# Patient Record
Sex: MALE | Race: WHITE | NOT HISPANIC OR LATINO | Employment: OTHER | URBAN - METROPOLITAN AREA
[De-identification: names, ages, dates, MRNs, and addresses within clinical notes are randomized per-mention and may not be internally consistent; named-entity substitution may affect disease eponyms.]

---

## 2022-05-03 ENCOUNTER — TELEPHONE (OUTPATIENT)
Dept: NEPHROLOGY | Facility: CLINIC | Age: 77
End: 2022-05-03
Payer: MEDICARE

## 2022-05-03 NOTE — TELEPHONE ENCOUNTER
----- Message from Mónica Manjula sent at 5/3/2022 12:44 PM CDT -----  Contact: pt  Type: Needs Medical Advice         Who Called: pt wife  Best Call Back Number:[phone]  Additional Information: Requesting a call back regarding  pt said she has a follow up visit that shows a 05/16/2022 at 1pm with office but our epic doesn't show next appt for pt. Pt would like office to call to confirm appt and blood work that is needed.        Please Advise- Thank you

## 2022-05-13 RX ORDER — LANCETS
EACH MISCELLANEOUS 3 TIMES DAILY
COMMUNITY
Start: 2022-05-02

## 2022-05-13 RX ORDER — DIVALPROEX SODIUM 500 MG/1
1500 TABLET, DELAYED RELEASE ORAL NIGHTLY
COMMUNITY
Start: 2022-03-21

## 2022-05-13 RX ORDER — TIZANIDINE 4 MG/1
4 TABLET ORAL EVERY 8 HOURS PRN
COMMUNITY
Start: 2022-03-17

## 2022-05-13 RX ORDER — OXYCODONE AND ACETAMINOPHEN 10; 325 MG/1; MG/1
1 TABLET ORAL EVERY 8 HOURS PRN
COMMUNITY
Start: 2022-03-26

## 2022-05-13 RX ORDER — ALBUTEROL SULFATE 90 UG/1
2 AEROSOL, METERED RESPIRATORY (INHALATION) EVERY 6 HOURS PRN
COMMUNITY

## 2022-05-13 RX ORDER — METHOCARBAMOL 500 MG/1
500 TABLET, FILM COATED ORAL 4 TIMES DAILY PRN
COMMUNITY
Start: 2022-04-17

## 2022-05-13 RX ORDER — ACETAMINOPHEN 325 MG/1
650 TABLET ORAL EVERY 4 HOURS PRN
COMMUNITY
Start: 2022-01-19

## 2022-05-13 RX ORDER — INSULIN ASPART 100 [IU]/ML
36 INJECTION, SUSPENSION SUBCUTANEOUS 2 TIMES DAILY WITH MEALS
COMMUNITY
Start: 2022-03-25

## 2022-05-13 RX ORDER — LAMOTRIGINE 100 MG/1
100 TABLET ORAL DAILY
COMMUNITY
Start: 2022-02-24

## 2022-05-13 RX ORDER — ATORVASTATIN CALCIUM 40 MG/1
40 TABLET, FILM COATED ORAL NIGHTLY
COMMUNITY
Start: 2022-04-17

## 2022-05-13 RX ORDER — LOSARTAN POTASSIUM 100 MG/1
100 TABLET ORAL DAILY
COMMUNITY
End: 2023-06-07 | Stop reason: ALTCHOICE

## 2022-05-13 RX ORDER — CLOPIDOGREL BISULFATE 75 MG/1
75 TABLET ORAL DAILY
COMMUNITY
Start: 2022-02-07 | End: 2023-06-07 | Stop reason: ALTCHOICE

## 2022-05-13 RX ORDER — CARVEDILOL 3.12 MG/1
3.12 TABLET ORAL 2 TIMES DAILY
COMMUNITY
Start: 2021-12-26

## 2022-05-13 RX ORDER — AMLODIPINE BESYLATE 10 MG/1
10 TABLET ORAL DAILY
COMMUNITY
Start: 2022-01-05

## 2022-05-13 RX ORDER — ASPIRIN 81 MG/1
81 TABLET ORAL DAILY
COMMUNITY

## 2022-05-13 RX ORDER — GABAPENTIN 100 MG/1
300 CAPSULE ORAL NIGHTLY
COMMUNITY
Start: 2022-01-07

## 2022-05-13 RX ORDER — FLUTICASONE PROPIONATE AND SALMETEROL XINAFOATE 45; 21 UG/1; UG/1
2 AEROSOL, METERED RESPIRATORY (INHALATION) 2 TIMES DAILY
COMMUNITY
Start: 2022-01-04

## 2022-05-13 RX ORDER — PEN NEEDLE, DIABETIC 31 GX5/16"
NEEDLE, DISPOSABLE MISCELLANEOUS 2 TIMES DAILY
COMMUNITY
Start: 2021-12-17

## 2022-05-13 RX ORDER — ISOSORBIDE MONONITRATE 30 MG/1
30 TABLET, EXTENDED RELEASE ORAL DAILY
COMMUNITY
Start: 2022-02-24

## 2022-05-13 RX ORDER — TAMSULOSIN HYDROCHLORIDE 0.4 MG/1
0.4 CAPSULE ORAL NIGHTLY
COMMUNITY
Start: 2022-04-06 | End: 2023-06-07 | Stop reason: ALTCHOICE

## 2022-05-13 RX ORDER — PANTOPRAZOLE SODIUM 40 MG/1
40 TABLET, DELAYED RELEASE ORAL DAILY PRN
COMMUNITY
Start: 2022-01-30

## 2022-05-17 ENCOUNTER — OFFICE VISIT (OUTPATIENT)
Dept: NEPHROLOGY | Facility: CLINIC | Age: 77
End: 2022-05-17
Payer: MEDICARE

## 2022-05-17 VITALS
WEIGHT: 215.38 LBS | DIASTOLIC BLOOD PRESSURE: 78 MMHG | BODY MASS INDEX: 28.54 KG/M2 | SYSTOLIC BLOOD PRESSURE: 159 MMHG | OXYGEN SATURATION: 97 % | HEART RATE: 60 BPM | HEIGHT: 73 IN | TEMPERATURE: 98 F

## 2022-05-17 DIAGNOSIS — E11.21 DIABETIC NEPHROPATHY ASSOCIATED WITH TYPE 2 DIABETES MELLITUS: Primary | ICD-10-CM

## 2022-05-17 DIAGNOSIS — N18.32 STAGE 3B CHRONIC KIDNEY DISEASE: ICD-10-CM

## 2022-05-17 DIAGNOSIS — Z90.5 ACQUIRED ABSENCE OF LEFT KIDNEY: ICD-10-CM

## 2022-05-17 DIAGNOSIS — R80.1 PERSISTENT PROTEINURIA: ICD-10-CM

## 2022-05-17 DIAGNOSIS — I13.10 HYPERTENSIVE HEART AND KIDNEY DISEASE WITHOUT HEART FAILURE AND WITH STAGE 3B CHRONIC KIDNEY DISEASE: ICD-10-CM

## 2022-05-17 DIAGNOSIS — N18.32 HYPERTENSIVE HEART AND KIDNEY DISEASE WITHOUT HEART FAILURE AND WITH STAGE 3B CHRONIC KIDNEY DISEASE: ICD-10-CM

## 2022-05-17 PROCEDURE — 99214 PR OFFICE/OUTPT VISIT, EST, LEVL IV, 30-39 MIN: ICD-10-PCS | Mod: S$GLB,,, | Performed by: STUDENT IN AN ORGANIZED HEALTH CARE EDUCATION/TRAINING PROGRAM

## 2022-05-17 PROCEDURE — 99214 OFFICE O/P EST MOD 30 MIN: CPT | Mod: S$GLB,,, | Performed by: STUDENT IN AN ORGANIZED HEALTH CARE EDUCATION/TRAINING PROGRAM

## 2022-05-17 NOTE — PROGRESS NOTES
"Outpatient Nephrology Progress Note    Pt Name:  Omar Young  Pt :  1945  Pt MRN:  [id number]    Date: 2022    Reason for visit:   Chronic kidney disease    Chief Complaint:   The patient denies any complaints today.    HPI:  The patient is being seen today for follow up of CKD and the status of his kidney function.    Since the last visit, the patient reports he had back surgery at OSH. He then suffered a fall and had to have another surgery. He went to rehab following that but is now back at home doing home health.   Home BPs when checked are "alright", often high in the morning, but then 130 in the afternoon.   He reports he was having some difficulty urinating when he first went back home, but this has resolved and he has no pain. He did have a holland catheter in place for a while prior.   The patient is avoiding NSAIDs.  His wife is present with him.  They ask many questions re: things they can do to improve his kidney function and overall health. They are very motivated and receptive.     The patient denies fatigue, weakness, poor appetite, metallic taste, nausea, cramping, chest pain, palpitations, SOB, orthopnea, PND, edema, trouble concentrating, decreased urination.          History:   Past Medical History:   Diagnosis Date    Asthma     Carotid artery occlusion     CKD (chronic kidney disease)     COPD (chronic obstructive pulmonary disease)     CPAP (continuous positive airway pressure) dependence     GERD (gastroesophageal reflux disease)     Hiatal hernia     History of blood clots     HTN (hypertension)     Hyperlipidemia     Obesity     INDIRA (obstructive sleep apnea)     Proteinuria     Renal cell carcinoma     Seizures     TIA (transient ischemic attack)     Type 2 diabetes mellitus      Past Surgical History:   Procedure Laterality Date    BACK SURGERY      CARDIAC CATHETERIZATION      CAROTID ENDARTERECTOMY      CORONARY ANGIOPLASTY WITH STENT PLACEMENT      " "NEPHRECTOMY       Family History   Problem Relation Age of Onset    Hypertension Mother     Heart disease Mother     Hypertension Father     Heart disease Father      Social History     Substance and Sexual Activity   Alcohol Use Never     Social History     Substance and Sexual Activity   Drug Use Not Currently     Social History     Substance and Sexual Activity   Sexual Activity Not Currently     reports previously being sexually active.  Social History     Tobacco Use   Smoking Status Former Smoker    Packs/day: 2.00    Years: 25.00    Pack years: 50.00    Types: Cigarettes    Quit date: 1990    Years since quittin.3   Smokeless Tobacco Former User    Quit date: 1990       Allergies:  Review of patient's allergies indicates:   Allergen Reactions    Codeine Other (See Comments)     hallucinations  hallucinations      Fexofenadine Other (See Comments)     Head aches, throat swelling  Throat swells & headache      Loratadine          Current Outpatient Medications:     acetaminophen (TYLENOL) 325 MG tablet, Take 650 mg by mouth every 4 (four) hours as needed., Disp: , Rfl:     albuterol (PROVENTIL/VENTOLIN HFA) 90 mcg/actuation inhaler, Inhale 2 puffs into the lungs every 6 (six) hours as needed., Disp: , Rfl:     amLODIPine (NORVASC) 10 MG tablet, Take 10 mg by mouth once daily., Disp: , Rfl:     aspirin (ECOTRIN) 81 MG EC tablet, Take 81 mg by mouth Daily., Disp: , Rfl:     atorvastatin (LIPITOR) 40 MG tablet, Take 40 mg by mouth nightly., Disp: , Rfl:     BD ULTRA-FINE MINI PEN NEEDLE 31 gauge x 3/16" Ndle, 2 (two) times a day., Disp: , Rfl:     blood sugar diagnostic Strp, TEST SUGARS TWICE DAILY, Disp: , Rfl:     clopidogreL (PLAVIX) 75 mg tablet, Take 75 mg by mouth once daily., Disp: , Rfl:     divalproex (DEPAKOTE) 500 MG TbEC, Take 1,500 mg by mouth every evening., Disp: , Rfl:     fluticasone propion-salmeterol 45-21 mcg/dose (ADVAIR HFA) 45-21 mcg/actuation HFAA " inhaler, Inhale 2 puffs into the lungs 2 (two) times a day., Disp: , Rfl:     gabapentin (NEURONTIN) 100 MG capsule, Take 300 mg by mouth nightly., Disp: , Rfl:     insulin aspart protamine-insulin aspart (NOVOLOG 70/30) 100 unit/mL (70-30) InPn pen, Inject 28 Units into the skin 2 (two) times daily with meals., Disp: , Rfl:     isosorbide mononitrate (IMDUR) 30 MG 24 hr tablet, Take 30 mg by mouth once daily., Disp: , Rfl:     lamoTRIgine (LAMICTAL) 100 MG tablet, Take 100 mg by mouth once daily., Disp: , Rfl:     lancets Misc, 3 (three) times daily., Disp: , Rfl:     pantoprazole (PROTONIX) 40 MG tablet, Take 40 mg by mouth once daily., Disp: , Rfl:     carvediloL (COREG) 3.125 MG tablet, Take 3.125 mg by mouth 2 (two) times daily., Disp: , Rfl:     losartan (COZAAR) 100 MG tablet, Take 100 mg by mouth Daily., Disp: , Rfl:     methocarbamoL (ROBAXIN) 500 MG Tab, Take 500 mg by mouth 4 (four) times daily as needed., Disp: , Rfl:     oxyCODONE-acetaminophen (PERCOCET)  mg per tablet, Take 1 tablet by mouth every 8 (eight) hours as needed., Disp: , Rfl:     tamsulosin (FLOMAX) 0.4 mg Cap, Take 0.4 mg by mouth every evening., Disp: , Rfl:     tiZANidine (ZANAFLEX) 4 MG tablet, Take 4 mg by mouth every 8 (eight) hours as needed., Disp: , Rfl:     Review of Systems   Constitutional: Positive for fatigue. Negative for appetite change, chills and fever.   Respiratory: Negative for cough, shortness of breath and wheezing.    Cardiovascular: Negative for chest pain and palpitations.   Gastrointestinal: Negative for abdominal pain.   Genitourinary: Negative for decreased urine volume, difficulty urinating, dysuria, flank pain, frequency, hematuria and urgency.   Musculoskeletal: Positive for arthralgias, back pain and gait problem.   Skin: Negative for rash.   Neurological: Negative for dizziness and light-headedness.   + chronic issues related to chronic medical conditions      Physical Exam:   Vitals:  "  Vitals:    05/17/22 1329   BP: (!) 159/78   Pulse: 60   Temp: 98.1 °F (36.7 °C)   TempSrc: Oral   SpO2: 97%   Weight: 97.7 kg (215 lb 6.4 oz)   Height: 6' 1" (1.854 m)   PainSc: 0-No pain     Body mass index is 28.42 kg/m².    Physical Exam  Vitals reviewed.   Constitutional:       General: He is not in acute distress.     Appearance: Normal appearance. He is not ill-appearing, toxic-appearing or diaphoretic.   HENT:      Head: Normocephalic and atraumatic.   Eyes:      General: No scleral icterus.        Right eye: No discharge.         Left eye: No discharge.      Extraocular Movements: Extraocular movements intact.      Conjunctiva/sclera: Conjunctivae normal.   Pulmonary:      Effort: Pulmonary effort is normal. No respiratory distress.   Musculoskeletal:         General: No swelling or deformity.      Right lower leg: No edema.      Left lower leg: No edema.   Skin:     General: Skin is warm and dry.      Coloration: Skin is not jaundiced or pale.      Findings: No bruising, erythema, lesion or rash.   Neurological:      General: No focal deficit present.      Mental Status: He is alert and oriented to person, place, and time. Mental status is at baseline.   Psychiatric:         Mood and Affect: Mood normal.         Behavior: Behavior normal.         Thought Content: Thought content normal.         Judgment: Judgment normal.     Ambulates slowly using walker      Labs/Tests:  Lab Results   Component Value Date     05/13/2022    K 3.9 05/13/2022     05/13/2022    CO2 26 05/13/2022    BUN 15 05/13/2022    CREATININE 1.67 (H) 05/13/2022    GLUCOSE 83 05/13/2022    CALCIUM 9.8 05/13/2022    ALBUMIN 4.1 05/13/2022    EGFRNONAA 39 (L) 05/13/2022    HGB 12.3 05/13/2022    HGBA1C 6.6 (H) 03/10/2022    FERRITIN 208.4 02/21/2020    HEPBSAG NON-REACTIVE 02/21/2020    TRIG 330 (H) 09/28/2021    CHOL 153 09/28/2021    HDL 26 (L) 09/28/2021    LDLCALC 61 09/28/2021    LABVLDL 66 (H) 09/28/2021       Problem " Nancy Nelson was seen today for chronic kidney disease and follow-up.    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 This remains improved from initial visit and recent baseline 2.2-2.5, previously 1.8-2.2 since 2019. UPCR 0.5 and stable.   CKD due to loss of nephron mass s/p left nephrectomy along with contributions from diabetes, HTN, vascular disease, CV disease, HLP, obesity, aging. Proteinuria not unexpected as result of hyperfiltration due to solitary kidney which can cause secondary FSGS process especially along with obesity and diabetes/HTN.   Renal biopsy relatively contraindicated given solitary kidney and wouldn't recommend given bland UA and well <1gm/day proteinuria and improving renal fxn making acute process such as GN unlikely.   Continued NSAID avoidance.  Discussed good BP, glycemic and lipid control to help prevent accelerated CKD and cardiovascular disease progression with the patient.   Continue ARB    2. HTN in CKD- seems reasonably controlled at home.   Goal BP< 130/80 as this was discussed with the patient. Discussed life style modifications with patient to include weight reduction (5-20mm Hg reduction/10Kg weight loss), DASH diet (8-14 mm Hg reduction), sodium restriction of 2.4 grams daily ( 2-8 mm Hg reduction), exercise ( 4-9 mm Hg reduction), and moderate alcohol consumption < 2 drinks per day (2-4 mm Hg reduction) per [facility] 7 report VONDA 2003.      3. S/p left nephrectomy- due to RCC. Right kidney visualized and normal on RUQUS in 2019. Would recommend updated imaging      4. Diabetic kidney disease-  On ARB, statin. SGLT2i  contraindicated when eGFR <30 so could be consider if renal fxn remains improved. Recommend follow up with PCM for management of diabetes.     5. CKD-MBD- In patients with CKD G3a - G5 not on dialysis the optimal PTH is not known. Treatment of CKD-MBD is targeted at lowering serum phosphate toward the normal range and avoiding hypercalcemia. Ca and phos are reasonable. Will check VitD-25 stores and replete if necessary. Goal VitD-25 level > 30 to ensure adequate stores.       The patient has been provided with their current level of kidney function including eGFR and creatinine.    Recommendations:   Monitor your BP at home daily and record. Bring readings to your next appt. Call the office if your BP is persistently >130/80.      Follow Up:   Follow up in about 4 months (around 9/17/2022).

## 2022-07-18 LAB — CRC RECOMMENDATION EXT: NORMAL

## 2022-09-06 DIAGNOSIS — D63.1 ANEMIA OF CHRONIC RENAL FAILURE, UNSPECIFIED CKD STAGE: ICD-10-CM

## 2022-09-06 DIAGNOSIS — N25.81 SECONDARY HYPERPARATHYROIDISM OF RENAL ORIGIN: ICD-10-CM

## 2022-09-06 DIAGNOSIS — N18.32 STAGE 3B CHRONIC KIDNEY DISEASE: Primary | ICD-10-CM

## 2022-09-06 DIAGNOSIS — N18.9 ANEMIA OF CHRONIC RENAL FAILURE, UNSPECIFIED CKD STAGE: ICD-10-CM

## 2022-11-30 ENCOUNTER — OFFICE VISIT (OUTPATIENT)
Dept: NEPHROLOGY | Facility: CLINIC | Age: 77
End: 2022-11-30
Payer: MEDICARE

## 2022-11-30 VITALS
OXYGEN SATURATION: 97 % | BODY MASS INDEX: 30.6 KG/M2 | SYSTOLIC BLOOD PRESSURE: 170 MMHG | DIASTOLIC BLOOD PRESSURE: 82 MMHG | HEIGHT: 73 IN | HEART RATE: 58 BPM | WEIGHT: 230.88 LBS

## 2022-11-30 DIAGNOSIS — E11.21 DIABETIC NEPHROPATHY ASSOCIATED WITH TYPE 2 DIABETES MELLITUS: ICD-10-CM

## 2022-11-30 DIAGNOSIS — N18.32 STAGE 3B CHRONIC KIDNEY DISEASE: Primary | ICD-10-CM

## 2022-11-30 DIAGNOSIS — I10 PRIMARY HYPERTENSION: ICD-10-CM

## 2022-11-30 DIAGNOSIS — N25.81 SECONDARY HYPERPARATHYROIDISM OF RENAL ORIGIN: ICD-10-CM

## 2022-11-30 PROCEDURE — 99212 OFFICE O/P EST SF 10 MIN: CPT | Mod: S$GLB,,, | Performed by: INTERNAL MEDICINE

## 2022-11-30 PROCEDURE — 99212 PR OFFICE/OUTPT VISIT, EST, LEVL II, 10-19 MIN: ICD-10-PCS | Mod: S$GLB,,, | Performed by: INTERNAL MEDICINE

## 2022-11-30 RX ORDER — HYDRALAZINE HYDROCHLORIDE 100 MG/1
100 TABLET, FILM COATED ORAL 3 TIMES DAILY
COMMUNITY
Start: 2022-06-02

## 2022-11-30 NOTE — PROGRESS NOTES
Pt Name:  Omar Young  Pt :  1945  Pt MRN:  [id number]    Date: 2022  Provider: Ekaterina Porter    Reason for visit:   Seeing for ckd.      Chief Complaint:   Chief Complaint   Patient presents with    Chronic Kidney Disease     Stage 3 cr 2.0 GFR 34    Proteinuria       HPI:  [unfilled]  No complaints.  States bp is better at home.    History:   Past Medical History:   Diagnosis Date    Asthma     Carotid artery occlusion     CKD (chronic kidney disease)     COPD (chronic obstructive pulmonary disease)     CPAP (continuous positive airway pressure) dependence     GERD (gastroesophageal reflux disease)     Hiatal hernia     History of blood clots     HTN (hypertension)     Hyperlipidemia     Obesity     INDIRA (obstructive sleep apnea)     Proteinuria     Renal cell carcinoma     Seizures     TIA (transient ischemic attack)     Type 2 diabetes mellitus      Past Surgical History:   Procedure Laterality Date    BACK SURGERY      CARDIAC CATHETERIZATION      CAROTID ENDARTERECTOMY      CORONARY ANGIOPLASTY WITH STENT PLACEMENT      NEPHRECTOMY       Family History   Problem Relation Age of Onset    Hypertension Mother     Heart disease Mother     Hypertension Father     Heart disease Father      Social History     Substance and Sexual Activity   Alcohol Use Never     Social History     Substance and Sexual Activity   Drug Use Not Currently     Social History     Substance and Sexual Activity   Sexual Activity Not Currently     reports that he is not currently sexually active.  Social History     Tobacco Use   Smoking Status Former    Packs/day: 2.00    Years: 25.00    Pack years: 50.00    Types: Cigarettes    Quit date: 1990    Years since quittin.9   Smokeless Tobacco Former    Quit date: 1990       Allergies:  Review of patient's allergies indicates:   Allergen Reactions    Fexofenadine Swelling     Head aches, throat swelling        Loratadine Swelling    Codeine Hallucinations  "      [unfilled]    ROS:   Constitutional:  Denies fever or chills   Eyes:  Denies change in visual acuity   HENT:  Denies nasal congestion or sore throat   Respiratory:  As in the history of the present illness.   Cardiovascular:  As in the history of the present illness.   GI:  As in the history of the present illness.    Musculoskeletal:  Denies back pain or joint pain   Integument:  Denies rash   Neurologic:  Denies headache, focal weakness or sensory changes   Endocrine:  Denies polyuria or polydipsia   Lymphatic:  Denies swollen glands   Psychiatric:  Denies depression or anxiety    Physical Exam:   Vitals:   Vitals:    11/30/22 0931   BP: (!) 170/82   Pulse: (!) 58   SpO2: 97%   Weight: 104.7 kg (230 lb 14.4 oz)   Height: 6' 1" (1.854 m)   PainSc: 0-No pain     Body mass index is 30.46 kg/m².    Constitutional:  Well developed, well nourished, and in no acute distress   Eyes:  PERRLA, conjunctiva normal   HENT:  Atraumatic, external ears normal, nose normal.  Neck: There is no jugular venous distension or thyromegaly.   Respiratory:  No respiratory distress, normal breath sounds, no rales, no wheezing   Cardiovascular:  Normal rate, and a regular rhythm, no murmurs, no gallops, no rub, and no edema.  GI:  Normal bowel sounds.  Musculoskeletal:  No deformities.   Neurologic:  Alert & oriented x 3, CN 2-12 normal, normal motor function, and no asterixis.   Psychiatric:  Speech and behavior appropriate.    Labs/Tests:  Lab Results   Component Value Date     11/15/2022    K 4.6 11/15/2022     11/15/2022    CO2 27 11/15/2022    BUN 23 11/15/2022    CREATININE 1.99 (H) 11/15/2022    CREATININE 1.80 (H) 09/06/2022    CREATININE 1.81 (H) 07/11/2022    GLUCOSE 3+ (A) 11/23/2022    CALCIUM 9.5 11/15/2022    PHOSPHORUS 3.2 09/06/2022    ALBUMIN 4.2 11/15/2022    EGFRNONAA 32 (L) 11/15/2022    EGFRNONAA 36 (L) 09/06/2022    EGFRNONAA 36 (L) 07/11/2022    ESTGFRAFRICA 37 (L) 11/15/2022    ESTGFRAFRICA 41 (L) " 09/06/2022    ESTGFRAFRICA 41 (L) 07/11/2022    PTH 79 (H) 11/23/2022    HGB 13.4 11/23/2022    HGB 13.5 11/17/2022    HGB 12.4 09/06/2022    HGBA1C 7.8 (H) 11/17/2022    FERRITIN 35.9 11/15/2022    HEPBSAG NON-REACTIVE 02/21/2020    TRIG 366 (H) 11/15/2022    CHOL 127 11/15/2022    HDL 24 (L) 11/15/2022    LDLCALC 30 11/15/2022    LABVLDL 73 (H) 11/15/2022    [id number] 69.9 05/13/2022       Assessment & Plan:    Visit Diagnosis:   [unfilled]  [unfilled]    Problem List: There is no problem list on file for this patient.    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    DM        Follow Up:   Follow up in about 6 months (around 5/30/2023).

## 2023-01-10 ENCOUNTER — PATIENT OUTREACH (OUTPATIENT)
Dept: ADMINISTRATIVE | Facility: HOSPITAL | Age: 78
End: 2023-01-10
Payer: MEDICARE

## 2023-01-10 NOTE — PROGRESS NOTES
Population Health Outreach.Records Received, hyper-linked into chart at this time. The following record(s)  below were uploaded for Health Maintenance .    7/18/2022-colonoscopy

## 2023-06-07 ENCOUNTER — OFFICE VISIT (OUTPATIENT)
Dept: NEPHROLOGY | Facility: CLINIC | Age: 78
End: 2023-06-07
Payer: MEDICARE

## 2023-06-07 VITALS
SYSTOLIC BLOOD PRESSURE: 169 MMHG | WEIGHT: 246 LBS | BODY MASS INDEX: 32.6 KG/M2 | HEART RATE: 52 BPM | DIASTOLIC BLOOD PRESSURE: 65 MMHG | OXYGEN SATURATION: 97 % | HEIGHT: 73 IN

## 2023-06-07 DIAGNOSIS — N18.4 STAGE 4 CHRONIC KIDNEY DISEASE: Primary | ICD-10-CM

## 2023-06-07 DIAGNOSIS — I10 PRIMARY HYPERTENSION: ICD-10-CM

## 2023-06-07 DIAGNOSIS — E11.21 DIABETIC NEPHROPATHY ASSOCIATED WITH TYPE 2 DIABETES MELLITUS: ICD-10-CM

## 2023-06-07 PROCEDURE — 99214 PR OFFICE/OUTPT VISIT, EST, LEVL IV, 30-39 MIN: ICD-10-PCS | Mod: S$GLB,,, | Performed by: INTERNAL MEDICINE

## 2023-06-07 PROCEDURE — 99214 OFFICE O/P EST MOD 30 MIN: CPT | Mod: S$GLB,,, | Performed by: INTERNAL MEDICINE

## 2023-06-07 RX ORDER — CLONIDINE HYDROCHLORIDE 0.2 MG/1
0.2 TABLET ORAL 2 TIMES DAILY
COMMUNITY
Start: 2023-05-26

## 2023-06-07 RX ORDER — TICAGRELOR 90 MG/1
90 TABLET ORAL 2 TIMES DAILY
COMMUNITY
Start: 2023-05-21

## 2023-06-07 NOTE — PROGRESS NOTES
Pt Name:  Omar Young  Pt :  1945  Pt MRN:  [id number]    Date: 2023  Provider: Ekaterina Porter    Reason for visit: seeing for ckd.      Chief Complaint:   Chief Complaint   Patient presents with    Chronic Kidney Disease     Serum creatinine 2.38, GFR 28, CKD stage 4       HPI:  HPI   No complaints. No gi, gu, pulm or card complaints.    History:   Past Medical History:   Diagnosis Date    Asthma     Carotid artery occlusion     CKD (chronic kidney disease)     COPD (chronic obstructive pulmonary disease)     CPAP (continuous positive airway pressure) dependence     GERD (gastroesophageal reflux disease)     Hiatal hernia     History of blood clots     HTN (hypertension)     Hyperlipidemia     Obesity     INDIRA (obstructive sleep apnea)     Personal history of colonic polyps     Proteinuria     Renal cell carcinoma     Seizures     TIA (transient ischemic attack)     Type 2 diabetes mellitus      Past Surgical History:   Procedure Laterality Date    BACK SURGERY      CARDIAC CATHETERIZATION      CAROTID ENDARTERECTOMY      COLONOSCOPY  2022    Shakira Rios MD    CORONARY ANGIOPLASTY WITH STENT PLACEMENT  2023    NEPHRECTOMY       Family History   Problem Relation Age of Onset    Hypertension Mother     Heart disease Mother     Hypertension Father     Heart disease Father      Social History     Substance and Sexual Activity   Alcohol Use Never     Social History     Substance and Sexual Activity   Drug Use Not Currently     Social History     Substance and Sexual Activity   Sexual Activity Not Currently     reports that he is not currently sexually active.  Social History     Tobacco Use   Smoking Status Former    Packs/day: 2.00    Years: 25.00    Pack years: 50.00    Types: Cigarettes    Quit date: 1990    Years since quittin.4   Smokeless Tobacco Former    Quit date: 1990       Allergies:  Review of patient's allergies indicates:   Allergen Reactions    Fexofenadine  "Swelling     Head aches, throat swelling        Loratadine Swelling    Codeine Hallucinations       Current Outpatient Medications   Medication Sig Dispense Refill    acetaminophen (TYLENOL) 325 MG tablet Take 650 mg by mouth every 4 (four) hours as needed.      albuterol (PROVENTIL/VENTOLIN HFA) 90 mcg/actuation inhaler Inhale 2 puffs into the lungs every 6 (six) hours as needed.      amLODIPine (NORVASC) 10 MG tablet Take 10 mg by mouth once daily.      aspirin (ECOTRIN) 81 MG EC tablet Take 81 mg by mouth Daily.      atorvastatin (LIPITOR) 40 MG tablet Take 40 mg by mouth nightly.      BD ULTRA-FINE MINI PEN NEEDLE 31 gauge x 3/16" Ndle 2 (two) times a day.      blood sugar diagnostic Strp TEST SUGARS TWICE DAILY      carvediloL (COREG) 3.125 MG tablet Take 3.125 mg by mouth 2 (two) times daily.      divalproex (DEPAKOTE) 500 MG TbEC Take 1,500 mg by mouth every evening.      fluticasone propion-salmeterol 45-21 mcg/dose (ADVAIR HFA) 45-21 mcg/actuation HFAA inhaler Inhale 2 puffs into the lungs 2 (two) times a day.      gabapentin (NEURONTIN) 100 MG capsule Take 300 mg by mouth nightly.      hydrALAZINE (APRESOLINE) 100 MG tablet Take 100 mg by mouth 3 (three) times daily.      insulin aspart protamine-insulin aspart (NOVOLOG 70/30) 100 unit/mL (70-30) InPn pen Inject 36 Units into the skin 2 (two) times daily with meals.      isosorbide mononitrate (IMDUR) 30 MG 24 hr tablet Take 30 mg by mouth once daily.      lamoTRIgine (LAMICTAL) 100 MG tablet Take 100 mg by mouth once daily.      lancets Misc 3 (three) times daily.      methocarbamoL (ROBAXIN) 500 MG Tab Take 500 mg by mouth 4 (four) times daily as needed.      oxyCODONE-acetaminophen (PERCOCET)  mg per tablet Take 1 tablet by mouth every 8 (eight) hours as needed.      pantoprazole (PROTONIX) 40 MG tablet Take 40 mg by mouth daily as needed.      tiZANidine (ZANAFLEX) 4 MG tablet Take 4 mg by mouth every 8 (eight) hours as needed.      BRILINTA 90 " "mg tablet Take 90 mg by mouth 2 (two) times daily.      cloNIDine (CATAPRES) 0.2 MG tablet Take 0.2 mg by mouth 2 (two) times daily.       No current facility-administered medications for this visit.        ROS:   Constitutional:  Denies fever or chills   Eyes:  Denies change in visual acuity   HENT:  Denies nasal congestion or sore throat   Respiratory:  As in the history of the present illness.   Cardiovascular:  As in the history of the present illness.   GI:  As in the history of the present illness.    Musculoskeletal:  Denies back pain or joint pain   Integument:  Denies rash   Neurologic:  Denies headache, focal weakness or sensory changes   Endocrine:  Denies polyuria or polydipsia   Lymphatic:  Denies swollen glands   Psychiatric:  Denies depression or anxiety    Physical Exam:   Vitals:   Vitals:    06/07/23 1432   BP: (!) 169/65   Pulse: (!) 52   SpO2: 97%   Weight: 111.6 kg (246 lb)   Height: 6' 1" (1.854 m)   PainSc: 0-No pain     Body mass index is 32.46 kg/m².    Constitutional:  Well developed, well nourished, and in no acute distress   Eyes:  PERRLA, conjunctiva normal   HENT:  Atraumatic, external ears normal, nose normal.  Neck: There is no jugular venous distension or thyromegaly.   Respiratory:  No respiratory distress, normal breath sounds, no rales, no wheezing   Cardiovascular:  Normal rate, and a regular rhythm, no murmurs, no gallops, no rub, and no edema.  GI:  Normal bowel sounds.  Musculoskeletal:  No deformities.   Neurologic:  Alert & oriented x 3, CN 2-12 normal, normal motor function, and no asterixis.   Psychiatric:  Speech and behavior appropriate.    Labs/Tests:  Lab Results   Component Value Date     11/15/2022    K 4.6 11/15/2022     11/15/2022    CO2 27 11/15/2022    BUN 23 11/15/2022    CREATININE 1.99 (H) 11/15/2022    CREATININE 1.80 (H) 09/06/2022    CREATININE 1.81 (H) 07/11/2022    GLUCOSE 4+ (A) 06/05/2023    CALCIUM 9.5 11/15/2022    PHOSPHORUS 3.2 " 09/06/2022    ALBUMIN 4.2 11/15/2022    EGFRNONAA 32 (L) 11/15/2022    EGFRNONAA 36 (L) 09/06/2022    EGFRNONAA 36 (L) 07/11/2022    ESTGFRAFRICA 37 (L) 11/15/2022    ESTGFRAFRICA 41 (L) 09/06/2022    ESTGFRAFRICA 41 (L) 07/11/2022    PTH 93 (H) 06/05/2023    HGB 11.6 (L) 06/05/2023    HGB 12.4 04/05/2023    HGB 10.9 (L) 04/03/2023    HGBA1C 7.8 (H) 11/17/2022    FERRITIN 35.9 11/15/2022    HEPBSAG NON-REACTIVE 02/21/2020    TRIG 366 (H) 11/15/2022    CHOL 127 11/15/2022    HDL 24 (L) 11/15/2022    LDLCALC 30 11/15/2022    LABVLDL 73 (H) 11/15/2022    [id number] 69.9 05/13/2022       Assessment & Plan:    Visit Diagnosis:   1. Stage 4 chronic kidney disease  US Kidney    US Kidney    Renal Function Panel    PTH, Intact    Renal Function Panel    PTH, Intact    Urinalysis    Urinalysis      2. Primary hypertension        3. Diabetic nephropathy associated with type 2 diabetes mellitus           Problem List: There is no problem list on file for this patient.  -  Ckd4 - cr is worse. Check US and drink water and rtc in only 3 months    Htn    Dm 2 on insulin.        1. Stage 4 chronic kidney disease  -     US Kidney; Future; Expected date: 09/07/2023  -     Renal Function Panel; Future; Expected date: 09/07/2023  -     PTH, Intact; Future; Expected date: 09/07/2023  -     Urinalysis; Future; Expected date: 09/07/2023    2. Primary hypertension    3. Diabetic nephropathy associated with type 2 diabetes mellitus             Follow Up:   Follow up in about 3 months (around 9/7/2023).